# Patient Record
Sex: MALE | Race: WHITE
[De-identification: names, ages, dates, MRNs, and addresses within clinical notes are randomized per-mention and may not be internally consistent; named-entity substitution may affect disease eponyms.]

---

## 2019-09-22 ENCOUNTER — HOSPITAL ENCOUNTER (EMERGENCY)
Dept: HOSPITAL 41 - JD.ED | Age: 59
Discharge: HOME | End: 2019-09-22
Payer: COMMERCIAL

## 2019-09-22 DIAGNOSIS — S05.52XA: Primary | ICD-10-CM

## 2019-09-22 DIAGNOSIS — E66.9: ICD-10-CM
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
## 2021-06-16 ENCOUNTER — HOSPITAL ENCOUNTER (EMERGENCY)
Dept: HOSPITAL 41 - JD.ED | Age: 61
Discharge: HOME | End: 2021-06-16
Payer: COMMERCIAL

## 2021-06-16 DIAGNOSIS — S61.217A: Primary | ICD-10-CM
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
## 2021-06-16 PROCEDURE — 99283 EMERGENCY DEPT VISIT LOW MDM: CPT

## 2021-06-16 PROCEDURE — 12001 RPR S/N/AX/GEN/TRNK 2.5CM/<: CPT

## 2021-06-16 PROCEDURE — 73140 X-RAY EXAM OF FINGER(S): CPT

## 2021-06-16 NOTE — EDM.PDOC
ED HPI GENERAL MEDICAL PROBLEM





- General


Chief Complaint: Laceration


Stated Complaint: HAND INJURY


Time Seen by Provider: 06/16/21 22:15


Source of Information: Reports: Patient


History Limitations: Reports: No Limitations





- History of Present Illness


INITIAL COMMENTS - FREE TEXT/NARRATIVE: 





Mr. Joseph is a very pleasant 60-year-old gentleman who now presents the ED 

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  His left 5th finger is lacerated, as well.  He

is otherwise uninjured.





The patient denies prior left 4th or 5th finger injuries.





The patient states that his last tetanus vaccination was on 11/26/2015.





Here in the ED, the patient's initial BP is found to be elevated at 177/98, 

otherwise, he is hemodynamically stable, afebrile, saturating 98% on room air.  

He appears to be comfortable, in no acute distress.





The patient denies having a recent fever, chills, sore throat, ear pain, nasal 

or sinus congestion, cough, dyspnea, chest pain, palpitations, nausea, vomiting,

constipation, diarrhea, abdominal pain, urinary symptoms, recent weight gain or 

weight loss, recent bloody bowel movements or black bowel movements, recent 

joint aches, headaches, or rashes.





I reviewed the PMHx/PSHx/SocHx, which was reviewed with the patient by the RN.








The patient's PCP is MOOKIE Grant, at the [facility].








  ** Left Hand


Pain Score (Numeric/FACES): 5





- Related Data


                                    Allergies











Allergy/AdvReac Type Severity Reaction Status Date / Time


 


No Known Allergies Allergy   Verified 09/22/19 02:22











Home Meds: 


                                    Home Meds





. [No Known Home Meds]  09/22/19 [History]











Past Medical History


Endocrine/Metabolic History: Reports: Obesity/BMI 30+





- Past Surgical History


GI Surgical History: Reports: Hernia, Abdominal


Musculoskeletal Surgical History: Reports: ORIF





Social & Family History





- Tobacco Use


Tobacco Use Status *Q: Never Tobacco User





- Caffeine Use


Caffeine Use: Reports: Soda





- Living Situation & Occupation


Living situation: Reports: , with Spouse


Occupation: Employed ()





ED ROS GENERAL





- Review of Systems


Review Of Systems: Comprehensive ROS is negative, except as noted in HPI.





ED EXAM, SKIN/RASH


Exam: See Below


Exam Limited By: No Limitations


General Appearance: Alert, WD/WN, No Apparent Distress


Extremities: Other (There is an approximately 1.5 cm irregular/jagged laceration

 to the dorsal and ulnar aspect of the patient's left 5th finger over the PIP 

joint.  The wound is not bleeding.  Good extensor and flexor strength of the 

finger.  Neurovascular status of the distal finger is intact.  There is some 

ecchymos)





ED SKIN PROCEDURES





- Laceration/Wound Repair


  ** Left Hand


Appearance: Subcutaneous, Irregular, Clean


Distal NVT: Neuro & Vascular Intact, No Tendon Injury


Anesthetic Type: Local


Local Anesthesia - Lidocaine (Xylocaine): 1% Plain (50:50 admixture)


Local Anesthesia - Bupivicaine (Marcaine): 0.5% Plain (50:50 admixture)


Local Anesthetic Volume: 1cc


Skin Prep: Providone-Iodine (Betadine)


Exploration/Debridement/Repair: Wound Explored, In a Bloodless Field, Explored 

to Base, No Foreign Material Found


Closed with: Sutures


Lac/Wound length In cm: 1.5


Suture Size: 3-0


# of Sutures: 8


Suture Type: Nylon (Ethilon), Running


Drain Placement: No


Sterile Dressing Applied: Nurse


Tetanus Status Addressed: Yes


Complications: No





Course





- Vital Signs


Last Recorded V/S: 


                                Last Vital Signs











Temp  36.6 C   06/16/21 21:57


 


Pulse  67   06/16/21 21:57


 


Resp  16   06/16/21 21:57


 


BP  177/98 H  06/16/21 21:57


 


Pulse Ox  98   06/16/21 21:57














- Orders/Labs/Meds


Orders: 


                               Active Orders 24 hr











 Category Date Time Status


 


 Fingers Fifth Digit Lt F4 [CR] Stat Exams  06/16/21 22:10 Taken











Meds: 


Medications














Discontinued Medications














Generic Name Dose Route Start Last Admin





  Trade Name Jeffrey  PRN Reason Stop Dose Admin


 


Bupivacaine HCl  10 ml  06/16/21 22:20  06/16/21 22:33





  Bupivacaine 0.5% 10 Ml Sdv  INJECT  06/16/21 22:21  10 ml





  ONETIME ONE   Administration


 


Lidocaine HCl  10 ml  06/16/21 22:20  06/16/21 22:33





  Lidocaine 1% 10 Ml Mdv  INJECT  06/16/21 22:21  10 ml





  ONETIME ONE   Administration














- Re-Assessments/Exams


Free Text/Narrative Re-Assessment/Exam: 





06/16/21 22:21


As above, the patient crushed his left 4th and 5th fingers around 20:30 this 

evening, while installing fencing.  He has an approximately 1.5 cm irregular 

laceration to the dorsal and ulnar aspect of his left 5th finger, with no 

suggestion of a tendinous or ligamentous injury, and normal distal sensation.  

The wound will require suturing.








06/16/21 22:45


4-view radiographs of the left 4th and 5th fingers appear to be grossly normal, 

with no fractures or dislocations identified.  Formal read per the Radiologist 

pending.








06/16/21 23:06


After a sterile field was prepared in the usual fashion, the patient's wound was

 anesthetized with local infiltration of a 50-50 admixture of bupivacaine 0.5% 

without epinephrine and lidocaine 1% without epinephrine.  The wound edges were 

then approximated with 8 running sutures using 3-0 Ethilon.  The patient 

tolerated the procedure well.  A Band-Aid will be applied per his nurse.  The 

sutures should be ready for removal by Thursday, 6/24/2021.





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  I advised him

 to avoid getting these chemicals on his wound.  He will look into finding some 

gloves that are impervious to oil and chemicals.














Departure





- Departure


Time of Disposition: 23:08


Disposition: Home, Self-Care 01


Condition: Good


Clinical Impression: 


 Laceration of left little finger








- Discharge Information


*PRESCRIPTION DRUG MONITORING PROGRAM REVIEWED*: Not Applicable


*COPY OF PRESCRIPTION DRUG MONITORING REPORT IN PATIENT BARI: Not Applicable


Instructions:  Laceration Care, Adult


Referrals: 


Dianelys Bennett [Ordering Only Provider] - 


Forms:  ED Department Discharge


Additional Instructions: 


You were seen in the emergency room after your left pinky and ring finger were 

crushed, with a laceration to your left pinky finger.





Work-up in the ER included x-rays of your left pinky and ring finger, which 

returned normal, with no broken bones or dislocations seen.





Your laceration was closed with 8 running sutures.





Keep the wound clean with ordinary soap and water when you bathe.  Pat dry, then

apply a clean bandage, daily.





It is important that you avoid getting oil or chemicals on the wound.





You may take over-the-counter Tylenol or ibuprofen as needed for discomfort.





It is unlikely that the wound will get infected, however, if you develop 

significant swelling, redness, drainage, or inordinate pain, please either see 

your PCP or return to the ER for reevaluation.





The sutures should be ready for removal by Thursday, 6/24/2021.  They can be 

removed at the walk-in clinic, by a nurse at your PCPs office, or in the ER.  Do

not try to remove them yourself.





Sepsis Event Note (ED)





- Evaluation


Sepsis Screening Result: No Definite Risk





- Focused Exam


Vital Signs: 


                                   Vital Signs











  Temp Pulse Resp BP Pulse Ox


 


 06/16/21 21:57  36.6 C  67  16  177/98 H  98














- My Orders


Last 24 Hours: 


My Active Orders





06/16/21 22:10


Fingers Fifth Digit Lt F4 [CR] Stat 














- Assessment/Plan


Last 24 Hours: 


My Active Orders





06/16/21 22:10


Fingers Fifth Digit Lt F4 [CR] Stat

## 2021-06-17 NOTE — CR
Left fifth finger: 4 view centered to the left fifth finger were 

obtained.

 

Comparison: No previous study.

 

No acute fracture, dislocation or other bony abnormality is 

appreciated.

 

Impression:

1.  No acute osseous finding is seen on left fifth finger study.

 

Diagnostic code #1